# Patient Record
Sex: FEMALE | Race: BLACK OR AFRICAN AMERICAN | NOT HISPANIC OR LATINO | Employment: FULL TIME | RURAL
[De-identification: names, ages, dates, MRNs, and addresses within clinical notes are randomized per-mention and may not be internally consistent; named-entity substitution may affect disease eponyms.]

---

## 2020-09-28 ENCOUNTER — HISTORICAL (OUTPATIENT)
Dept: ADMINISTRATIVE | Facility: HOSPITAL | Age: 60
End: 2020-09-28

## 2021-10-26 ENCOUNTER — CLINICAL SUPPORT (OUTPATIENT)
Dept: PRIMARY CARE CLINIC | Facility: CLINIC | Age: 61
End: 2021-10-26

## 2021-10-26 DIAGNOSIS — Z23 INFLUENZA VACCINE NEEDED: Primary | ICD-10-CM

## 2021-10-26 PROCEDURE — 90686 IIV4 VACC NO PRSV 0.5 ML IM: CPT | Mod: ,,, | Performed by: NURSE PRACTITIONER

## 2021-10-26 PROCEDURE — 90471 PR IMMUNIZ ADMIN,1 SINGLE/COMB VAC/TOXOID: ICD-10-PCS | Mod: ,,, | Performed by: NURSE PRACTITIONER

## 2021-10-26 PROCEDURE — 90686 FLU VACCINE (QUAD) GREATER THAN OR EQUAL TO 3YO PRESERVATIVE FREE IM: ICD-10-PCS | Mod: ,,, | Performed by: NURSE PRACTITIONER

## 2021-10-26 PROCEDURE — 90471 IMMUNIZATION ADMIN: CPT | Mod: ,,, | Performed by: NURSE PRACTITIONER

## 2022-05-16 DIAGNOSIS — G56.02 CARPAL TUNNEL SYNDROME ON LEFT: Primary | ICD-10-CM

## 2022-05-18 NOTE — PROGRESS NOTES
[facility] Occupational Therapy  Initial Evaluation     Date: 5/19/2022  Name: Madhavi Simons  Clinic Number: [id number]    Therapy Diagnosis: No diagnosis found.  Physician: Geovanny Kendrick MD    Physician Orders: Evaluate and treat.  Medical Diagnosis: Carpal tunnel syndrome on left [G56.02]  Surgical Procedure and Date: 4/1/2022, / Date of Injury/Onset: 2021  Evaluation Date: 5/19/2022  Insurance Authorization Period Expiration: 5/16/2022 - 5/16/2023  Plan of Care Certification Period: 5/19/2022-7/28/2022  Date of Return to MD: 5 weeks    Visit # / Visits authorized: 1 / 20    FOTO: initial eval  Medicare Amount:     Time In:4:00  Time Out: 4:40  Total treatment time: 40minutes      Precautions:  Standard    Subjective     Involved Side: Left upper extremity  Dominant Side: Right  Date of Onset: 2021  History of Current Condition: Pt. Reports she started having pain in left hand in 2021. Reports pain continued to get worse. She saw her PCP her referred her to orthopedic surgeon. 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 Surgery performed on 4/1/2022. She was in a splint for 2 weeks and a brace for 4 weeks.    Imaging: MRI studies, bone scan films       Past Medical History/Physical Systems Review:   Madhavi Simons  has no past medical history on file.    Madhavi Simons  has no past surgical history on file.    Madhavi currently has no medications in their medication list.    Review of patient's allergies indicates:  Not on File     Patient's Goals for Therapy: To be able to use LUE again without pain.    Pain:  Functional Pain Scale Rating 0-10:   5/10 on average  5/10 at best  10/10 at worst  Location: Left wrist/hand  Description: Aching, Dull, Burning, Throbbing, Grabbing, Tight, Tingling, Numb, Sharp, Electric and Shooting  Aggravating Factors: Bending, Touching, Walking, Night Time, Morning, Extension, Flexing, Lifting and Getting out of bed/chair  Easing  Factors: massage, relaxation, ice, rest and elevation    Occupation:  [employer]  Working presently: employed      Functional Limitations/Social History:    Previous functional status includes: Independent with all ADLs.     Current FunctionalStatus   Home/Living environment : lives with their spouse      Limitation of Functional Status as follows:   ADLs/IADLs:     - Feeding: (I)    - Bathing: (I)    - Dressing/Grooming: (I)    - Driving: (I)             Objective     Observation/Appearance:     Edema. Measured in centimeters.   5/18/2022 5/18/2022    Right Left   Wrist Crease 14.5 15.4   MCPs 17.5 18     Edema. Measured in centimeters.   5/18/2022 5/18/2022    Right Left   Index:       P1      PIP     P2      DIP     P3     Long:       P1      PIP     P2      DIP     P3     Ring:       P1                 PIP                P2                  DIP     P3     Small:        P1                 PIP            P2             DIP     P3     Thumb:     Prox. Phalanx     IP     Distal Phalanx       Elbow and Wrist ROM. Measured in degrees.   5/18/2022 5/18/2022    Right Left   Wrist Ext/Flex 80/90 60/36     Hand ROM. Measured in degrees.   5/18/2022 5/18/2022    Right Left        Index: MP  88 84              PIP     110 113              DIP 82 72              HINES          Long:  MP 86 89               116              DIP 80 71              HINES          Ring:   MP 81 90               111              DIP 74 71              HINES          Small:  MP 91 91                101               DIP 76 68              HINES          Thumb: MP 56 65                IP 81 65      Strength (Dynamometer) and Pinch Strength (Pinch Gauge)  Measured in pounds.   5/18/2022 5/18/2022    Right Left   Rung II 43 NT   Key Pinch 11 NT   3pt Pinch 10 NT   2pt Pinch 7 NT       Manual Muscle Test   5/18/2022 5/18/2022    Right Left   Wrist Extension  5/5 3+/5   Wrist Flexion 5/5 3+/5         Special Tests  Thumb CMC Grind  Test    Finkelstein's Test    Phalen's Test    Tinel's Test    Ric's Test    Mingo-Littler Test    Digital Collateral Stress Test    ORL Test    Froment's Sign    Pinch OK Sign    Hollie's Sign     Egawa Sign     Clamp Sign     SL Ballottement Test    LT Ballottement Test    Scaphoid/WatsonTest    Linscheid's Test    Metacarpal Stress Test    Piano Key Test    ECU Synergy Test    Ulnar Compression Test    TFCC Load Test    Ulnocarpal Stress Test    Midcarpal Shift Test    Pisiform Boost Test    Elbow Flexion Test    Scratch Collapse Test    Tennis Elbow Test    Resisted Middle Finger Extension Test    Mills Test    Chair Test    Biceps Squeeze Test    Biceps Hook Test    Milking Test    Press Up Manuever    PLRI Test    Valgus Stress Test    Varus Stress Test    Spurling Test    Cervical Distraction Test    ULNTT - General    ULNTT - Median Nerve    ULNTT - Radial Nerve    ULNTT - Ulnar Nerve         CMS Impairment/Limitation/Restriction for FOTO  Survey    Therapist reviewed FOTO scores for Madhavi Simons on 5/19/2022.   FOTO documents entered into [employer] - see Media section.    Limitation Score: 40%         Treatment     Treatment Time In: 11:00  Treatment Time Out: 11:40    MADHAVI received the following supervised modalities after being cleared for contradictions for  minutes:   -N/A    MADHAVI received the following manual therapy techniques for  minutes:   -N/A    MADHAVI received therapeutic exercises for  minutes including:  -N/A    Home Exercise Program/Education:  Issued HEP (see patient instructions in EMR) and educated on modality use for pain management . Exercises were reviewed and MADHAVI was able to demonstrate them prior to the end of the session.   Pt received a written copy of exercises to perform at home. MADHAVI demonstrated good  understanding of the education provided.  Pt was advised to perform these exercises free of pain, and to stop performing them if pain occurs.    Patient/Family Education:  role of OT, goals for OT, scheduling/cancellations - pt verbalized understanding. Discussed insurance limitations with patient.    Additional Education provided:     Assessment     Madhavi Simons is a 61 y.o. female referred to outpatient occupational therapy and presents with a medical diagnosis of Left carpal tunnel, AIN,and PIN release, resulting in decreased ROM/strength with increased pain/edema and numbness/tingling and demonstrates limitations as described above. Following medical record review it is determined that pt will benefit from occupational therapy services in order to maximize pain free and/or functional use of left upper extremity. The following goals were discussed with the patient and patient is in agreement with them as to be addressed in the treatment plan. The patient's rehab potential is Good.     Anticipated barriers to occupational therapy:   Pt has no cultural, educational or language barriers to learning provided.        Goals:   The following goals were discussed with the patient and patient is in agreement with them as to be addressed in the treatment plan.   Short term Goals:  1) Initiate HEP  2) Pt will increase AROM of LUE by 5-10 degrees in order to assist with functional full fist by 4 weeks.  3) Pt will reduce edema by .5-1 cm in affected fingers by 4 weeks.  4) Pt will reduce pain to less than 4/10 by 4 weeks.  5) Pt will increase functional  strength by 5# in order to A in opening containers for med management or home management tasks by 4 weeks.   6) Patient will be able to achieve less than or equal to 40% on the FOTO, demonstrating overall improved functional ability with upper extremity. (Self-care category)    Long Term Goals:  Goals to be met by discharge:  1) Independent with HEP  2) Pt will increase LUE HINES by 15-20 degrees in order to increase functional fist for grasp with home management or work related tasks by d/c.   3) Pt will decrease edema to trace or none  to increase functional ROM by d/c.   4) Pt will decrease pain to trace or none while completing light home management tasks or work related tasks by d/c.   5) Patient will be able to achieve less than or equal to 80% on the FOTO, demonstrating overall improved functional ability with upper extremity.  (Self-care category)        Plan   Certification Period/Plan of care expiration: 5/19/2022 to 7/28/2022.    Outpatient Occupational Therapy 2 times weekly for 10 weeks to include the following interventions: Paraffin, Fluidotherapy, Manual therapy/joint mobilizations, Modalities for pain management, US 3 mhz, Therapeutic exercises/activities., Iontophoresis with 2.0 cc Dexamethasone, Strengthening, Orthotic Fabrication/Fit/Training, Edema Control, Scar Management, Electrical Modalities, Joint Protection and Energy Conservation.      RONEY YU, OT

## 2022-05-19 ENCOUNTER — CLINICAL SUPPORT (OUTPATIENT)
Dept: REHABILITATION | Facility: HOSPITAL | Age: 62
End: 2022-05-19
Payer: COMMERCIAL

## 2022-05-19 DIAGNOSIS — G56.02 CARPAL TUNNEL SYNDROME ON LEFT: ICD-10-CM

## 2022-05-19 PROCEDURE — 97166 OT EVAL MOD COMPLEX 45 MIN: CPT

## 2022-05-19 NOTE — PLAN OF CARE
[facility] Occupational Therapy  Initial Evaluation     Date: 5/19/2022  Name: Madhavi Simons  Clinic Number: [id number]    Therapy Diagnosis: No diagnosis found.  Physician: Geovanny Kendrick MD    Physician Orders: Evaluate and treat.  Medical Diagnosis: Carpal tunnel syndrome on left [G56.02]  Surgical Procedure and Date: 4/1/2022, / Date of Injury/Onset: 2021  Evaluation Date: 5/19/2022  Insurance Authorization Period Expiration: 5/16/2022 - 5/16/2023  Plan of Care Certification Period: 5/19/2022-7/28/2022  Date of Return to MD: 5 weeks    Visit # / Visits authorized: 1 / 20    FOTO: initial eval  Medicare Amount:     Time In:4:00  Time Out: 4:40  Total treatment time: 40minutes      Precautions:  Standard    Subjective     Involved Side: Left upper extremity  Dominant Side: Right  Date of Onset: 2021  History of Current Condition: Pt. Reports she started having pain in left hand in 2021. Reports pain continued to get worse. She saw her PCP her referred her to orthopedic surgeon. 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 Surgery performed on 4/1/2022. She was in a splint for 2 weeks and a brace for 4 weeks.    Imaging: MRI studies, bone scan films       Past Medical History/Physical Systems Review:   Madhavi Simons  has no past medical history on file.    Madhavi Simons  has no past surgical history on file.    Madhavi currently has no medications in their medication list.    Review of patient's allergies indicates:  Not on File     Patient's Goals for Therapy: To be able to use LUE again without pain.    Pain:  Functional Pain Scale Rating 0-10:   5/10 on average  5/10 at best  10/10 at worst  Location: Left wrist/hand  Description: Aching, Dull, Burning, Throbbing, Grabbing, Tight, Tingling, Numb, Sharp, Electric and Shooting  Aggravating Factors: Bending, Touching, Walking, Night Time, Morning, Extension, Flexing, Lifting and Getting out of bed/chair  Easing  Factors: massage, relaxation, ice, rest and elevation    Occupation:  [employer]  Working presently: employed      Functional Limitations/Social History:    Previous functional status includes: Independent with all ADLs.     Current FunctionalStatus   Home/Living environment : lives with their spouse      Limitation of Functional Status as follows:   ADLs/IADLs:     - Feeding: (I)    - Bathing: (I)    - Dressing/Grooming: (I)    - Driving: (I)             Objective     Observation/Appearance:     Edema. Measured in centimeters.   5/18/2022 5/18/2022    Right Left   Wrist Crease 14.5 15.4   MCPs 17.5 18     Edema. Measured in centimeters.   5/18/2022 5/18/2022    Right Left   Index:       P1      PIP     P2      DIP     P3     Long:       P1      PIP     P2      DIP     P3     Ring:       P1                 PIP                P2                  DIP     P3     Small:        P1                 PIP            P2             DIP     P3     Thumb:     Prox. Phalanx     IP     Distal Phalanx       Elbow and Wrist ROM. Measured in degrees.   5/18/2022 5/18/2022    Right Left   Wrist Ext/Flex 80/90 60/36     Hand ROM. Measured in degrees.   5/18/2022 5/18/2022    Right Left        Index: MP  88 84              PIP     110 113              DIP 82 72              HINES          Long:  MP 86 89               116              DIP 80 71              HINES          Ring:   MP 81 90               111              DIP 74 71              HINES          Small:  MP 91 91                101               DIP 76 68              HINES          Thumb: MP 56 65                IP 81 65      Strength (Dynamometer) and Pinch Strength (Pinch Gauge)  Measured in pounds.   5/18/2022 5/18/2022    Right Left   Rung II 43 NT   Key Pinch 11 NT   3pt Pinch 10 NT   2pt Pinch 7 NT       Manual Muscle Test   5/18/2022 5/18/2022    Right Left   Wrist Extension  5/5 3+/5   Wrist Flexion 5/5 3+/5         Special Tests  Thumb CMC Grind  Test    Finkelstein's Test    Phalen's Test    Tinel's Test    Ric's Test    Mingo-Littler Test    Digital Collateral Stress Test    ORL Test    Froment's Sign    Pinch OK Sign    Hollie's Sign     Egawa Sign     Clamp Sign     SL Ballottement Test    LT Ballottement Test    Scaphoid/WatsonTest    Linscheid's Test    Metacarpal Stress Test    Piano Key Test    ECU Synergy Test    Ulnar Compression Test    TFCC Load Test    Ulnocarpal Stress Test    Midcarpal Shift Test    Pisiform Boost Test    Elbow Flexion Test    Scratch Collapse Test    Tennis Elbow Test    Resisted Middle Finger Extension Test    Mills Test    Chair Test    Biceps Squeeze Test    Biceps Hook Test    Milking Test    Press Up Manuever    PLRI Test    Valgus Stress Test    Varus Stress Test    Spurling Test    Cervical Distraction Test    ULNTT - General    ULNTT - Median Nerve    ULNTT - Radial Nerve    ULNTT - Ulnar Nerve         CMS Impairment/Limitation/Restriction for FOTO  Survey    Therapist reviewed FOTO scores for Madhavi Simons on 5/19/2022.   FOTO documents entered into [employer] - see Media section.    Limitation Score: 40%         Treatment     Treatment Time In: 11:00  Treatment Time Out: 11:40    MADHAVI received the following supervised modalities after being cleared for contradictions for  minutes:   -N/A    MADHAVI received the following manual therapy techniques for  minutes:   -N/A    MADHAVI received therapeutic exercises for  minutes including:  -N/A    Home Exercise Program/Education:  Issued HEP (see patient instructions in EMR) and educated on modality use for pain management . Exercises were reviewed and MADHAVI was able to demonstrate them prior to the end of the session.   Pt received a written copy of exercises to perform at home. MADHAVI demonstrated good  understanding of the education provided.  Pt was advised to perform these exercises free of pain, and to stop performing them if pain occurs.    Patient/Family Education:  role of OT, goals for OT, scheduling/cancellations - pt verbalized understanding. Discussed insurance limitations with patient.    Additional Education provided:     Assessment     Madhavi Simons is a 61 y.o. female referred to outpatient occupational therapy and presents with a medical diagnosis of Left carpal tunnel, AIN,and PIN release, resulting in decreased ROM/strength with increased pain/edema and numbness/tingling and demonstrates limitations as described above. Following medical record review it is determined that pt will benefit from occupational therapy services in order to maximize pain free and/or functional use of left upper extremity. The following goals were discussed with the patient and patient is in agreement with them as to be addressed in the treatment plan. The patient's rehab potential is Good.     Anticipated barriers to occupational therapy:   Pt has no cultural, educational or language barriers to learning provided.        Goals:   The following goals were discussed with the patient and patient is in agreement with them as to be addressed in the treatment plan.   Short term Goals:  1) Initiate HEP  2) Pt will increase AROM of LUE by 5-10 degrees in order to assist with functional full fist by 4 weeks.  3) Pt will reduce edema by .5-1 cm in affected fingers by 4 weeks.  4) Pt will reduce pain to less than 4/10 by 4 weeks.  5) Pt will increase functional  strength by 5# in order to A in opening containers for med management or home management tasks by 4 weeks.   6) Patient will be able to achieve less than or equal to 40% on the FOTO, demonstrating overall improved functional ability with upper extremity. (Self-care category)    Long Term Goals:  Goals to be met by discharge:  1) Independent with HEP  2) Pt will increase LUE HINES by 15-20 degrees in order to increase functional fist for grasp with home management or work related tasks by d/c.   3) Pt will decrease edema to trace or none  to increase functional ROM by d/c.   4) Pt will decrease pain to trace or none while completing light home management tasks or work related tasks by d/c.   5) Patient will be able to achieve less than or equal to 80% on the FOTO, demonstrating overall improved functional ability with upper extremity.  (Self-care category)        Plan   Certification Period/Plan of care expiration: 5/19/2022 to 7/28/2022.    Outpatient Occupational Therapy 2 times weekly for 10 weeks to include the following interventions: Paraffin, Fluidotherapy, Manual therapy/joint mobilizations, Modalities for pain management, US 3 mhz, Therapeutic exercises/activities., Iontophoresis with 2.0 cc Dexamethasone, Strengthening, Orthotic Fabrication/Fit/Training, Edema Control, Scar Management, Electrical Modalities, Joint Protection and Energy Conservation.      RONEY YU, OT

## 2022-06-08 ENCOUNTER — CLINICAL SUPPORT (OUTPATIENT)
Dept: REHABILITATION | Facility: HOSPITAL | Age: 62
End: 2022-06-08
Payer: COMMERCIAL

## 2022-06-08 DIAGNOSIS — G56.02 CARPAL TUNNEL SYNDROME ON LEFT: Primary | ICD-10-CM

## 2022-06-08 PROCEDURE — 97110 THERAPEUTIC EXERCISES: CPT

## 2022-06-08 PROCEDURE — 97033 APP MDLTY 1+IONTPHRSIS EA 15: CPT

## 2022-06-08 PROCEDURE — 97140 MANUAL THERAPY 1/> REGIONS: CPT

## 2022-06-08 NOTE — PROGRESS NOTES
[facility]  Occupational Therapy Treatment Note    Date: 6/8/2022  Name: Madhavi ALAN [facility] Number: [id number]    Therapy Diagnosis: No diagnosis found.  Physician: Geovanny Kendrick MD    Physician Orders: Evaluate and treat.  Medical Diagnosis: Carpal tunnel syndrome on left [G56.02]  Surgical Procedure and Date: 4/1/2022,   Evaluation Date: 5/19/2022  Insurance Authorization Period Expiration: 5/16/2022 - 5/16/2023  Plan of Care Expiration: 5/19/2022-7/28/2022  Date of Return to MD: 5weeks  Visit # / Visits authorized: 2 / 20      Precautions:  Standard    Time In: 4:42  Time Out: 5:40  Total Billable Time: 58 minutes    SUBJECTIVE     Pt reports: reports no new complaints  She was compliant with home exercise program given last session.   Response to previous treatment:  Functional change:     Pain: 4/10  Location: left fingers , hands  and wrists      OBJECTIVE     Objective Measures updated at progress report unless specified.    Treatment     MADHAVI received the treatments listed below:     Supervised modalities after being cleared for contradictions: Paraffin bath - N/A    Direct contact modalities after being cleared for contraindications:Iontophoresis- 10min.    Manual therapy techniques: Joint mobilizations, Myofacial release, Soft tissue Mobilization and Friction Massage were applied to the: LUE for 20 minutes, including:  -LUE PROM- wrist flx/ext- 2x10 , joint mobz/distraction- 2x10 , Tendon glides- 3x5    Therapeutic exercises to develop strength, endurance, ROM and flexibility for 28 minutes, including:  -LUE AROM- wrist flx/ext tband-2x10  , Handgripper- 5x10 , therabar-2x10  , powerweb flx/ext-2x10  , digi flx-  1x15        Patient Education and Home Exercises      Education provided:   -   - Progress towards goals     Written Home Exercises Provided: Patient instructed to cont prior HEP.  Exercises were reviewed and MADHAVI was able to demonstrate them prior to the end of  the session.  DANNA demonstrated good  understanding of the HEP provided. See EMR under Patient Instructions for exercises provided during therapy sessions.       Assessment     Pt would continue to benefit from skilled OT.      DANNA is progressing well towards her goals and there are no updates to goals at this time. Pt prognosis is Excellent.     Pt will continue to benefit from skilled outpatient occupational therapy to address the deficits listed in the problem list on initial evaluation provide pt/family education and to maximize pt's level of independence in the home and community environment.     Pt's spiritual, cultural and educational needs considered and pt agreeable to plan of care and goals.    Anticipated barriers to occupational therapy:     Goals:  Pt. Will increase AROM of LUE as measured by goniometric measurements.  Pt. Will increase Left /pinch strength as measured by dynamometer/pinch gauge.  Pt. Will demonstrate ability to utilize LUE to perform functional tasks (I).  Pt. Will be (I) with HEP.    PLAN     Continue OT POC.      RONEY YU, OT

## 2022-06-09 ENCOUNTER — CLINICAL SUPPORT (OUTPATIENT)
Dept: REHABILITATION | Facility: HOSPITAL | Age: 62
End: 2022-06-09
Payer: COMMERCIAL

## 2022-06-09 DIAGNOSIS — G56.02 CARPAL TUNNEL SYNDROME ON LEFT: Primary | ICD-10-CM

## 2022-06-09 PROCEDURE — 97140 MANUAL THERAPY 1/> REGIONS: CPT

## 2022-06-09 PROCEDURE — 97018 PARAFFIN BATH THERAPY: CPT | Mod: 59

## 2022-06-09 PROCEDURE — 97110 THERAPEUTIC EXERCISES: CPT

## 2022-06-09 NOTE — PROGRESS NOTES
[facility]  Occupational Therapy Treatment Note    Date: 6/9/2022  [id number]    Therapy Diagnosis: No diagnosis found.  Physician: Geovanny Kendrick MD    Physician Orders: Evaluate and treat.  Medical Diagnosis: Carpal tunnel syndrome on left [G56.02]  Surgical Procedure and Date: 4/1/2022,   Evaluation Date: 5/19/2022  Insurance Authorization Period Expiration: 5/16/2022 - 5/16/2023  Plan of Care Expiration: 5/19/2022-7/28/2022  Date of Return to MD: 5weeks  Visit # / Visits authorized: 3 / 20      Precautions:  Standard    Time In: 2:56  Time Out: 3:52  Total Billable Time: 56 minutes    SUBJECTIVE     Pt reports: she was sore from yesterdays session.  She was compliant with home exercise program given last session.   Response to previous treatment:  Functional change:     Pain: 5/10  Location: left fingers , hands  and wrists      OBJECTIVE     Objective Measures updated at progress report unless specified.    Treatment     MADHAVI received the treatments listed below:     Supervised modalities after being cleared for contradictions: Paraffin bath - 8min.    Direct contact modalities after being cleared for contraindications:N/A    Manual therapy techniques: Joint mobilizations, Myofacial release, Soft tissue Mobilization and Friction Massage were applied to the: LUE for 20 minutes, including:  -LUE PROM- wrist flx/ext- 2x10 , joint mobz/distraction- 2x10 , Tendon glides- 3x5    Therapeutic exercises to develop strength, endurance, ROM and flexibility for 26 minutes, including:  -LUE AROM- wrist flx/ext tband-2x10  , Handgripper- 5x10 , therabar-2x10  , powerweb flx/ext-2x10  , digi flx-  1x15        Patient Education and Home Exercises      Education provided:   -   - Progress towards goals     Written Home Exercises Provided: Patient instructed to cont prior HEP.  Exercises were reviewed and MADHAVI was able to demonstrate them prior to the end of the  session.  DANNA demonstrated good  understanding of the HEP provided. See EMR under Patient Instructions for exercises provided during therapy sessions.       Assessment     Pt would continue to benefit from skilled OT.      DANNA is progressing well towards her goals and there are no updates to goals at this time. Pt prognosis is Excellent.     Pt will continue to benefit from skilled outpatient occupational therapy to address the deficits listed in the problem list on initial evaluation provide pt/family education and to maximize pt's level of independence in the home and community environment.     Pt's spiritual, cultural and educational needs considered and pt agreeable to plan of care and goals.    Anticipated barriers to occupational therapy:     Goals:  Pt. Will increase AROM of LUE as measured by goniometric measurements.  Pt. Will increase Left /pinch strength as measured by dynamometer/pinch gauge.  Pt. Will demonstrate ability to utilize LUE to perform functional tasks (I).  Pt. Will be (I) with HEP.    PLAN     Continue OT POC.      RONEY YU, OT

## 2022-06-14 ENCOUNTER — CLINICAL SUPPORT (OUTPATIENT)
Dept: REHABILITATION | Facility: HOSPITAL | Age: 62
End: 2022-06-14
Payer: COMMERCIAL

## 2022-06-14 DIAGNOSIS — G56.02 CARPAL TUNNEL SYNDROME ON LEFT: Primary | ICD-10-CM

## 2022-06-14 PROCEDURE — 97140 MANUAL THERAPY 1/> REGIONS: CPT

## 2022-06-14 PROCEDURE — 97110 THERAPEUTIC EXERCISES: CPT

## 2022-06-14 PROCEDURE — 97018 PARAFFIN BATH THERAPY: CPT

## 2022-06-14 NOTE — PROGRESS NOTES
[facility]  Occupational Therapy Treatment Note    Date: 6/14/2022  [id number]    Therapy Diagnosis: No diagnosis found.  Physician: Geovanny Kendrick MD    Physician Orders: Evaluate and treat.  Medical Diagnosis: Carpal tunnel syndrome on left [G56.02]  Surgical Procedure and Date: 4/1/2022,   Evaluation Date: 5/19/2022  Insurance Authorization Period Expiration: 5/16/2022 - 5/16/2023  Plan of Care Expiration: 5/19/2022-7/28/2022  Date of Return to MD: 5weeks  Visit # / Visits authorized: 4 / 20      Precautions:  Standard    Time In: 3:56  Time Out: 4:51  Total Billable Time: 55 minutes    SUBJECTIVE     Pt reports: she was in pain over the weekend but is much better now.  She was compliant with home exercise program given last session.   Response to previous treatment:  Functional change:     Pain: 3/10  Location: left fingers , hands  and wrists      OBJECTIVE     Objective Measures updated at progress report unless specified.    Treatment     MADHAVI received the treatments listed below:     Supervised modalities after being cleared for contradictions: Paraffin bath - 8min.    Direct contact modalities after being cleared for contraindications:N/A    Manual therapy techniques: Joint mobilizations, Myofacial release, Soft tissue Mobilization and Friction Massage were applied to the: LUE for 20 minutes, including:  -LUE PROM- wrist flx/ext- 2x10 , joint mobz/distraction- 2x10 , Tendon glides- 3x5    Therapeutic exercises to develop strength, endurance, ROM and flexibility for 27 minutes, including:  -LUE AROM- wrist flx/ext tband-2x10  , Handgripper- 5x10 , therabar-2x10  , powerweb flx/ext-2x10  , digi flx-  1x15        Patient Education and Home Exercises      Education provided:   -   - Progress towards goals     Written Home Exercises Provided: Patient instructed to cont prior HEP.  Exercises were reviewed and MADHAVI was able to demonstrate them prior  to the end of the session.  DANNA demonstrated good  understanding of the HEP provided. See EMR under Patient Instructions for exercises provided during therapy sessions.       Assessment     Pt would continue to benefit from skilled OT.      DANNA is progressing well towards her goals and there are no updates to goals at this time. Pt prognosis is Excellent.     Pt will continue to benefit from skilled outpatient occupational therapy to address the deficits listed in the problem list on initial evaluation provide pt/family education and to maximize pt's level of independence in the home and community environment.     Pt's spiritual, cultural and educational needs considered and pt agreeable to plan of care and goals.    Anticipated barriers to occupational therapy:     Goals:  Pt. Will increase AROM of LUE as measured by goniometric measurements.  Pt. Will increase Left /pinch strength as measured by dynamometer/pinch gauge.  Pt. Will demonstrate ability to utilize LUE to perform functional tasks (I).  Pt. Will be (I) with HEP.    PLAN     Continue OT POC.      RONEY YU, OT

## 2022-06-16 ENCOUNTER — CLINICAL SUPPORT (OUTPATIENT)
Dept: REHABILITATION | Facility: HOSPITAL | Age: 62
End: 2022-06-16
Payer: COMMERCIAL

## 2022-06-16 DIAGNOSIS — G56.02 CARPAL TUNNEL SYNDROME ON LEFT: Primary | ICD-10-CM

## 2022-06-16 PROCEDURE — 97033 APP MDLTY 1+IONTPHRSIS EA 15: CPT

## 2022-06-16 PROCEDURE — 97140 MANUAL THERAPY 1/> REGIONS: CPT

## 2022-06-16 PROCEDURE — 97110 THERAPEUTIC EXERCISES: CPT

## 2022-06-16 NOTE — PROGRESS NOTES
[facility]  Occupational Therapy Treatment Note    Date: 6/16/2022  Name: Madhavi ALAN [facility] Number: [id number]    Therapy Diagnosis: No diagnosis found.  Physician: Geovanny Kendrick MD    Physician Orders: Evaluate and treat.  Medical Diagnosis: Carpal tunnel syndrome on left [G56.02]  Surgical Procedure and Date: 4/1/2022,   Evaluation Date: 5/19/2022  Insurance Authorization Period Expiration: 5/16/2022 - 5/16/2023  Plan of Care Expiration: 5/19/2022-7/28/2022  Date of Return to MD: 5weeks  Visit # / Visits authorized: 5/ 20      Precautions:  Standard    Time In: 3:57  Time Out: 4:51  Total Billable Time: 54 minutes    SUBJECTIVE     Pt reports: she has been having pain first thing in the morning.  She was compliant with home exercise program given last session.   Response to previous treatment:  Functional change:     Pain: 4/10  Location: left fingers , hands  and wrists      OBJECTIVE     Objective Measures updated at progress report unless specified.    Treatment     MADHAVI received the treatments listed below:     Supervised modalities after being cleared for contradictions: Paraffin bath - N/A    Direct contact modalities after being cleared for contraindications:Iontophoresis-8min.    Manual therapy techniques: Joint mobilizations, Myofacial release, Soft tissue Mobilization and Friction Massage were applied to the: LUE for 20 minutes, including:  -LUE PROM- wrist flx/ext- 2x10 , joint mobz/distraction- 2x10 , Tendon glides- 3x5    Therapeutic exercises to develop strength, endurance, ROM and flexibility for 26 minutes, including:  -LUE AROM- wrist flx/ext tband-2x10  , Handgripper- 5x10 , therabar-2x10  , powerweb flx/ext-2x10  , digi flx-  1x15        Patient Education and Home Exercises      Education provided:   -   - Progress towards goals     Written Home Exercises Provided: Patient instructed to cont prior HEP.  Exercises were reviewed and MADHAVI was able to demonstrate  them prior to the end of the session.  DANNA demonstrated good  understanding of the HEP provided. See EMR under Patient Instructions for exercises provided during therapy sessions.       Assessment     Pt would continue to benefit from skilled OT.      DANNA is progressing well towards her goals and there are no updates to goals at this time. Pt prognosis is Excellent.     Pt will continue to benefit from skilled outpatient occupational therapy to address the deficits listed in the problem list on initial evaluation provide pt/family education and to maximize pt's level of independence in the home and community environment.     Pt's spiritual, cultural and educational needs considered and pt agreeable to plan of care and goals.    Anticipated barriers to occupational therapy:     Goals:  Pt. Will increase AROM of LUE as measured by goniometric measurements.  Pt. Will increase Left /pinch strength as measured by dynamometer/pinch gauge.  Pt. Will demonstrate ability to utilize LUE to perform functional tasks (I).  Pt. Will be (I) with HEP.    PLAN     Continue OT POC.      RONEY YU, OT

## 2022-06-20 ENCOUNTER — CLINICAL SUPPORT (OUTPATIENT)
Dept: REHABILITATION | Facility: HOSPITAL | Age: 62
End: 2022-06-20
Payer: COMMERCIAL

## 2022-06-20 DIAGNOSIS — G56.02 CARPAL TUNNEL SYNDROME ON LEFT: Primary | ICD-10-CM

## 2022-06-20 PROCEDURE — 97018 PARAFFIN BATH THERAPY: CPT

## 2022-06-20 PROCEDURE — 97110 THERAPEUTIC EXERCISES: CPT

## 2022-06-20 PROCEDURE — 97140 MANUAL THERAPY 1/> REGIONS: CPT

## 2022-06-20 NOTE — PROGRESS NOTES
[facility]  Occupational Therapy Treatment Note    Date: 6/20/2022  [id number]    Therapy Diagnosis: No diagnosis found.  Physician: Geovanny Kendrick MD    Physician Orders: Evaluate and treat.  Medical Diagnosis: Carpal tunnel syndrome on left [G56.02]  Surgical Procedure and Date: 4/1/2022,   Evaluation Date: 5/19/2022  Insurance Authorization Period Expiration: 5/16/2022 - 5/16/2023  Plan of Care Expiration: 5/19/2022-7/28/2022  Date of Return to MD: 5weeks  Visit # / Visits authorized: 6/ 20      Precautions:  Standard    Time In: 3:52  Time Out: 4:51  Total Billable Time: 59 minutes    SUBJECTIVE     Pt reports: she had pain last night and this morning with more numbness/tingling.   She was compliant with home exercise program given last session.   Response to previous treatment:  Functional change:     Pain: 3/10  Location: left fingers , hands  and wrists      OBJECTIVE     Objective Measures updated at progress report unless specified.    Treatment     MADHAVI received the treatments listed below:     Supervised modalities after being cleared for contradictions: Paraffin bath - 8min.    Direct contact modalities after being cleared for contraindications:Iontophoresis-N/A    Manual therapy techniques: Joint mobilizations, Myofacial release, Soft tissue Mobilization and Friction Massage were applied to the: LUE for 21 minutes, including:  -LUE PROM- wrist flx/ext- 2x10 , joint mobz/distraction- 2x10 , Tendon glides- 3x5    Therapeutic exercises to develop strength, endurance, ROM and flexibility for 30 minutes, including:  -LUE AROM- wrist flx/ext tband-2x10  , Handgripper- 5x10 , therabar-2x10  , powerweb flx/ext-2x10  , digi flx-  2x15        Patient Education and Home Exercises      Education provided:   -   - Progress towards goals     Written Home Exercises Provided: Patient instructed to cont prior HEP.  Exercises were reviewed and MADHAVI was  able to demonstrate them prior to the end of the session.  DANNA demonstrated good  understanding of the HEP provided. See EMR under Patient Instructions for exercises provided during therapy sessions.       Assessment     Pt would continue to benefit from skilled OT.      DANNA is progressing well towards her goals and there are no updates to goals at this time. Pt prognosis is Excellent.     Pt will continue to benefit from skilled outpatient occupational therapy to address the deficits listed in the problem list on initial evaluation provide pt/family education and to maximize pt's level of independence in the home and community environment.     Pt's spiritual, cultural and educational needs considered and pt agreeable to plan of care and goals.    Anticipated barriers to occupational therapy:     Goals:  Pt. Will increase AROM of LUE as measured by goniometric measurements.  Pt. Will increase Left /pinch strength as measured by dynamometer/pinch gauge.  Pt. Will demonstrate ability to utilize LUE to perform functional tasks (I).  Pt. Will be (I) with HEP.    PLAN     Continue OT POC.      RONEY YU, OT

## 2022-06-23 ENCOUNTER — CLINICAL SUPPORT (OUTPATIENT)
Dept: REHABILITATION | Facility: HOSPITAL | Age: 62
End: 2022-06-23
Payer: COMMERCIAL

## 2022-06-23 DIAGNOSIS — G56.02 CARPAL TUNNEL SYNDROME ON LEFT: Primary | ICD-10-CM

## 2022-06-23 PROCEDURE — 97140 MANUAL THERAPY 1/> REGIONS: CPT

## 2022-06-23 PROCEDURE — 97018 PARAFFIN BATH THERAPY: CPT

## 2022-06-23 PROCEDURE — 97110 THERAPEUTIC EXERCISES: CPT

## 2022-06-23 NOTE — PLAN OF CARE
[facility]  Occupational Therapy Progress Note    Date: 6/23/2022  [id number]    Therapy Diagnosis: No diagnosis found.  Physician: Geovanny Kendrick MD    Physician Orders: Evaluate and treat.  Medical Diagnosis: Carpal tunnel syndrome on left [G56.02]  Surgical Procedure and Date: 4/1/2022,   Evaluation Date: 5/19/2022  Insurance Authorization Period Expiration: 5/16/2022 - 5/16/2023  Plan of Care Expiration: 5/19/2022-7/28/2022  Date of Return to MD: 5weeks  Visit # / Visits authorized: 7/ 20      Precautions:  Standard    Time In: 4:00  Time Out: 4:56  Total Billable Time:56  minutes    SUBJECTIVE     Pt reports: she is no pain right now.  She was compliant with home exercise program given last session.   Response to previous treatment:  Functional change:     Pain: 0/10  Location: left fingers , hands  and wrists      OBJECTIVE     Objective Measures updated at progress report unless specified.    Treatment     MADHAVI received the treatments listed below:     Supervised modalities after being cleared for contradictions: Paraffin bath - 8min.    Direct contact modalities after being cleared for contraindications:Iontophoresis-N/A    Manual therapy techniques: Joint mobilizations, Myofacial release, Soft tissue Mobilization and Friction Massage were applied to the: LUE for 20 minutes, including:  -LUE PROM- wrist flx/ext- 2x10 , joint mobz/distraction- 2x10 , Tendon glides- 3x5    Therapeutic exercises to develop strength, endurance, ROM and flexibility for 28 minutes, including:  -LUE AROM- wrist flx/ext tband-2x10  , Handgripper- 5x10 , therabar-2x10  , powerweb flx/ext-2x10  , digi flx-  2x15,         Patient Education and Home Exercises      Education provided:   -   - Progress towards goals     Written Home Exercises Provided: Patient instructed to cont prior HEP.  Exercises were reviewed and MADHAVI was able to demonstrate them prior to the end of the  session.  DANNA demonstrated good  understanding of the HEP provided. See EMR under Patient Instructions for exercises provided during therapy sessions.       Assessment     Pt would continue to benefit from skilled OT.      DANNA is progressing well towards her goals and there are no updates to goals at this time. Pt prognosis is Excellent.     Pt will continue to benefit from skilled outpatient occupational therapy to address the deficits listed in the problem list on initial evaluation provide pt/family education and to maximize pt's level of independence in the home and community environment.     Pt's spiritual, cultural and educational needs considered and pt agreeable to plan of care and goals.    Anticipated barriers to occupational therapy:     Goals:  Pt. Will increase AROM of LUE as measured by goniometric measurements.( LUE AROM-  Wrist flx- 46 , wrist ext- 70 , digit 1 MP-61  , IP-58  , 2MCP- 78 , PIP- 106 , DIP- 76 , 3MCP- 84 , PIP-110  , DIP- 70 , 4MCP-90  , PIP-111  , DIP-67  , 5MCP-91  , PIP-104  , DIP-64  )  Pt. Will increase Left /pinch strength as measured by dynamometer/pinch gauge.( Left -31  , Pinch- Lateral-8  , tip- 4 , tripod- 5 )  Pt. Will demonstrate ability to utilize LUE to perform functional tasks (I).( Pt. Demonstrates ability to utilize LUE to perform functional tasks (I).  Pt. Will be (I) with HEP.( Pt. Verbalizes and demonstrates ability to perform HEP (I).)    PLAN   Continues to progress each week. Continues to exhibit sharp shooting pain with certain positions, however is improving.    Outpatient Occupational Therapy 2 times weekly for 10 weeks to include the following interventions: Paraffin, Fluidotherapy, Manual therapy/joint mobilizations, Modalities for pain management, US 3 mhz, Therapeutic exercises/activities., Iontophoresis with 2.0 cc Dexamethasone, Strengthening, Orthotic Fabrication/Fit/Training, Edema Control, Scar Management, Electrical Modalities, Joint  Protection and Energy Conservation.      RONEY YU, OT

## 2022-06-23 NOTE — PROGRESS NOTES
[facility]  Occupational Therapy Progress Note    Date: 6/23/2022  [id number]    Therapy Diagnosis: No diagnosis found.  Physician: Geovanny Kendrick MD    Physician Orders: Evaluate and treat.  Medical Diagnosis: Carpal tunnel syndrome on left [G56.02]  Surgical Procedure and Date: 4/1/2022,   Evaluation Date: 5/19/2022  Insurance Authorization Period Expiration: 5/16/2022 - 5/16/2023  Plan of Care Expiration: 5/19/2022-7/28/2022  Date of Return to MD: 5weeks  Visit # / Visits authorized: 7/ 20      Precautions:  Standard    Time In: 4:00  Time Out: 4:56  Total Billable Time:56  minutes    SUBJECTIVE     Pt reports: she is no pain right now.  She was compliant with home exercise program given last session.   Response to previous treatment:  Functional change:     Pain: 0/10  Location: left fingers , hands  and wrists      OBJECTIVE     Objective Measures updated at progress report unless specified.    Treatment     MADHAVI received the treatments listed below:     Supervised modalities after being cleared for contradictions: Paraffin bath - 8min.    Direct contact modalities after being cleared for contraindications:Iontophoresis-N/A    Manual therapy techniques: Joint mobilizations, Myofacial release, Soft tissue Mobilization and Friction Massage were applied to the: LUE for 20 minutes, including:  -LUE PROM- wrist flx/ext- 2x10 , joint mobz/distraction- 2x10 , Tendon glides- 3x5    Therapeutic exercises to develop strength, endurance, ROM and flexibility for 28 minutes, including:  -LUE AROM- wrist flx/ext tband-2x10  , Handgripper- 5x10 , therabar-2x10  , powerweb flx/ext-2x10  , digi flx-  2x15,         Patient Education and Home Exercises      Education provided:   -   - Progress towards goals     Written Home Exercises Provided: Patient instructed to cont prior HEP.  Exercises were reviewed and MADHAVI was able to demonstrate them prior to the end of  the session.  DANNA demonstrated good  understanding of the HEP provided. See EMR under Patient Instructions for exercises provided during therapy sessions.       Assessment     Pt would continue to benefit from skilled OT.      DANNA is progressing well towards her goals and there are no updates to goals at this time. Pt prognosis is Excellent.     Pt will continue to benefit from skilled outpatient occupational therapy to address the deficits listed in the problem list on initial evaluation provide pt/family education and to maximize pt's level of independence in the home and community environment.     Pt's spiritual, cultural and educational needs considered and pt agreeable to plan of care and goals.    Anticipated barriers to occupational therapy:     Goals:  Pt. Will increase AROM of LUE as measured by goniometric measurements.( LUE AROM-  Wrist flx- 46 , wrist ext- 70 , digit 1 MP-61  , IP-58  , 2MCP- 78 , PIP- 106 , DIP- 76 , 3MCP- 84 , PIP-110  , DIP- 70 , 4MCP-90  , PIP-111  , DIP-67  , 5MCP-91  , PIP-104  , DIP-64  )  Pt. Will increase Left /pinch strength as measured by dynamometer/pinch gauge.( Left -31  , Pinch- Lateral-8  , tip- 4 , tripod- 5 )  Pt. Will demonstrate ability to utilize LUE to perform functional tasks (I).( Pt. Demonstrates ability to utilize LUE to perform functional tasks (I).  Pt. Will be (I) with HEP.( Pt. Verbalizes and demonstrates ability to perform HEP (I).)    PLAN   Continues to progress each week. Continues to exhibit sharp shooting pain with certain positions, however is improving.    Outpatient Occupational Therapy 2 times weekly for 10 weeks to include the following interventions: Paraffin, Fluidotherapy, Manual therapy/joint mobilizations, Modalities for pain management, US 3 mhz, Therapeutic exercises/activities., Iontophoresis with 2.0 cc Dexamethasone, Strengthening, Orthotic Fabrication/Fit/Training, Edema Control, Scar Management, Electrical Modalities, Joint  Protection and Energy Conservation.      RONEY YU, OT

## 2022-06-28 ENCOUNTER — CLINICAL SUPPORT (OUTPATIENT)
Dept: REHABILITATION | Facility: HOSPITAL | Age: 62
End: 2022-06-28
Payer: COMMERCIAL

## 2022-06-28 DIAGNOSIS — G56.02 CARPAL TUNNEL SYNDROME ON LEFT: Primary | ICD-10-CM

## 2022-06-28 PROCEDURE — 97110 THERAPEUTIC EXERCISES: CPT

## 2022-06-28 PROCEDURE — 97140 MANUAL THERAPY 1/> REGIONS: CPT

## 2022-06-28 PROCEDURE — 97018 PARAFFIN BATH THERAPY: CPT

## 2022-06-28 NOTE — PROGRESS NOTES
[facility]  Occupational Therapy Treatment Note    Date: 6/28/2022  [id number]    Therapy Diagnosis: No diagnosis found.  Physician: Geovanny Kendrick MD    Physician Orders: Evaluate and treat.  Medical Diagnosis: Carpal tunnel syndrome on left [G56.02]  Surgical Procedure and Date: 4/1/2022,   Evaluation Date: 5/19/2022  Insurance Authorization Period Expiration: 5/16/2022 - 5/16/2023  Plan of Care Expiration: 5/19/2022-7/28/2022  Date of Return to MD: 5weeks  Visit # / Visits authorized: 8/ 20      Precautions:  Standard    Time In: 3:57  Time Out: 4:57  Total Billable Time:60  minutes    SUBJECTIVE     Pt reports: no new complaints  She was compliant with home exercise program given last session.   Response to previous treatment:  Functional change:     Pain: 1/10  Location: left fingers , hands  and wrists      OBJECTIVE     Objective Measures updated at progress report unless specified.    Treatment     MADHAVI received the treatments listed below:     Supervised modalities after being cleared for contradictions: Paraffin bath - 8min.    Direct contact modalities after being cleared for contraindications:Iontophoresis-N/A    Manual therapy techniques: Joint mobilizations, Myofacial release, Soft tissue Mobilization and Friction Massage were applied to the: LUE for 22 minutes, including:  -LUE PROM- wrist flx/ext- 2x10 , joint mobz/distraction- 2x10 , Tendon glides- 3x5    Therapeutic exercises to develop strength, endurance, ROM and flexibility for 30 minutes, including:  -LUE AROM- wrist flx/ext tband-2x10  , Handgripper- 5x10 , therabar-2x10  , powerweb flx/ext-2x10  , digi flx-  2x15        Patient Education and Home Exercises      Education provided:   -   - Progress towards goals     Written Home Exercises Provided: Patient instructed to cont prior HEP.  Exercises were reviewed and MADHAVI was able to demonstrate them prior to the end of the  session.  DANNA demonstrated good  understanding of the HEP provided. See EMR under Patient Instructions for exercises provided during therapy sessions.       Assessment     Pt would continue to benefit from skilled OT.      DANNA is progressing well towards her goals and there are no updates to goals at this time. Pt prognosis is Excellent.     Pt will continue to benefit from skilled outpatient occupational therapy to address the deficits listed in the problem list on initial evaluation provide pt/family education and to maximize pt's level of independence in the home and community environment.     Pt's spiritual, cultural and educational needs considered and pt agreeable to plan of care and goals.    Anticipated barriers to occupational therapy:     Goals:  Pt. Will increase AROM of LUE as measured by goniometric measurements.  Pt. Will increase Left /pinch strength as measured by dynamometer/pinch gauge.  Pt. Will demonstrate ability to utilize LUE to perform functional tasks (I).  Pt. Will be (I) with HEP.    PLAN     Continue OT POC.      RONEY YU, OT

## 2022-07-05 ENCOUNTER — CLINICAL SUPPORT (OUTPATIENT)
Dept: REHABILITATION | Facility: HOSPITAL | Age: 62
End: 2022-07-05
Payer: COMMERCIAL

## 2022-07-05 DIAGNOSIS — G56.02 CARPAL TUNNEL SYNDROME ON LEFT: Primary | ICD-10-CM

## 2022-07-05 PROCEDURE — 97033 APP MDLTY 1+IONTPHRSIS EA 15: CPT

## 2022-07-05 PROCEDURE — 97140 MANUAL THERAPY 1/> REGIONS: CPT

## 2022-07-05 PROCEDURE — 97110 THERAPEUTIC EXERCISES: CPT

## 2022-07-05 NOTE — PROGRESS NOTES
[facility]  Occupational Therapy Treatment Note    Date: 7/5/2022  Name: Madhavi ALAN [facility] Number: [id number]    Therapy Diagnosis: No diagnosis found.  Physician: Geovanny Kendrick MD    Physician Orders: Evaluate and treat.  Medical Diagnosis: Carpal tunnel syndrome on left [G56.02]  Surgical Procedure and Date: 4/1/2022,   Evaluation Date: 5/19/2022  Insurance Authorization Period Expiration: 5/16/2022 - 5/16/2023  Plan of Care Expiration: 5/19/2022-7/28/2022  Date of Return to MD: 5weeks  Visit # / Visits authorized: 9/ 20      Precautions:  Standard    Time In: 3:56  Time Out: 4:45  Total Billable Time: 49minutes    SUBJECTIVE     Pt reports: she fell on LUE at home.  She was compliant with home exercise program given last session.   Response to previous treatment:  Functional change:     Pain: 4/10  Location: left fingers , hands  and wrists      OBJECTIVE     Objective Measures updated at progress report unless specified.    Treatment     MADHAVI received the treatments listed below:     Supervised modalities after being cleared for contradictions: Paraffin bath -N/A    Direct contact modalities after being cleared for contraindications:Iontophoresis-8min.    Manual therapy techniques: Joint mobilizations, Myofacial release, Soft tissue Mobilization and Friction Massage were applied to the: LUE for 20 minutes, including:  -LUE PROM- wrist flx/ext- 2x10 , joint mobz/distraction- 2x10 , Tendon glides- 3x5    Therapeutic exercises to develop strength, endurance, ROM and flexibility for 21 minutes, including:  -LUE AROM- wrist flx/ext tband-2x10  , Handgripper- 3x10 , therabar-1x10  , powerweb flx/ext-2x10  , digi flx-  2x15        Patient Education and Home Exercises      Education provided:   -   - Progress towards goals     Written Home Exercises Provided: Patient instructed to cont prior HEP.  Exercises were reviewed and MADHAVI was able to demonstrate them prior to the end of the  session.  DANNA demonstrated good  understanding of the HEP provided. See EMR under Patient Instructions for exercises provided during therapy sessions.       Assessment     Pt would continue to benefit from skilled OT.      DANNA is progressing well towards her goals and there are no updates to goals at this time. Pt prognosis is Excellent.     Pt will continue to benefit from skilled outpatient occupational therapy to address the deficits listed in the problem list on initial evaluation provide pt/family education and to maximize pt's level of independence in the home and community environment.     Pt's spiritual, cultural and educational needs considered and pt agreeable to plan of care and goals.    Anticipated barriers to occupational therapy:     Goals:  Pt. Will increase AROM of LUE as measured by goniometric measurements.  Pt. Will increase Left /pinch strength as measured by dynamometer/pinch gauge.  Pt. Will demonstrate ability to utilize LUE to perform functional tasks (I).  Pt. Will be (I) with HEP.    PLAN   Session ended early today due to pain.  Continue OT POC.      RONEY YU, OT

## 2022-07-07 ENCOUNTER — CLINICAL SUPPORT (OUTPATIENT)
Dept: REHABILITATION | Facility: HOSPITAL | Age: 62
End: 2022-07-07
Payer: COMMERCIAL

## 2022-07-07 DIAGNOSIS — G56.02 CARPAL TUNNEL SYNDROME ON LEFT: Primary | ICD-10-CM

## 2022-07-07 PROCEDURE — 97140 MANUAL THERAPY 1/> REGIONS: CPT

## 2022-07-07 PROCEDURE — 97110 THERAPEUTIC EXERCISES: CPT

## 2022-07-07 NOTE — PROGRESS NOTES
[facility]  Occupational Therapy Treatment Note    Date: 7/7/2022  Name: Madhavi ALAN [facility] Number: [id number]    Therapy Diagnosis: No diagnosis found.  Physician: Geovanny Kendrick MD    Physician Orders: Evaluate and treat.  Medical Diagnosis: Carpal tunnel syndrome on left [G56.02]  Surgical Procedure and Date: 4/1/2022,   Evaluation Date: 5/19/2022  Insurance Authorization Period Expiration: 5/16/2022 - 5/16/2023  Plan of Care Expiration: 5/19/2022-7/28/2022  Date of Return to MD: 5weeks  Visit # / Visits authorized: 10/ 20      Precautions:  Standard    Time In: 4:07  Time Out: 4:54  Total Billable Time:47 minutes    SUBJECTIVE     Pt reports: hand and wrists feel better today.  She was compliant with home exercise program given last session.   Response to previous treatment:  Functional change:     Pain: 0/10  Location: left fingers , hands  and wrists      OBJECTIVE     Objective Measures updated at progress report unless specified.    Treatment     MADHAVI received the treatments listed below:     Supervised modalities after being cleared for contradictions: Paraffin bath -N/A    Direct contact modalities after being cleared for contraindications:Iontophoresis-N/A    Manual therapy techniques: Joint mobilizations, Myofacial release, Soft tissue Mobilization and Friction Massage were applied to the: LUE for 20 minutes, including:  -LUE PROM- wrist flx/ext- 2x10 , joint mobz/distraction- 2x10 , Tendon glides- 3x5    Therapeutic exercises to develop strength, endurance, ROM and flexibility for 27 minutes, including:  -LUE AROM- wrist flx/ext tband-3x10  , Handgripper- 6x10 , therabar-3x10  , powerweb flx/ext-3x10  , digi flx-  3x10        Patient Education and Home Exercises      Education provided:   -   - Progress towards goals     Written Home Exercises Provided: Patient instructed to cont prior HEP.  Exercises were reviewed and MADHAVI was able to demonstrate them prior to the end  of the session.  DANNA demonstrated good  understanding of the HEP provided. See EMR under Patient Instructions for exercises provided during therapy sessions.       Assessment     Pt would continue to benefit from skilled OT.      DANNA is progressing well towards her goals and there are no updates to goals at this time. Pt prognosis is Excellent.     Pt will continue to benefit from skilled outpatient occupational therapy to address the deficits listed in the problem list on initial evaluation provide pt/family education and to maximize pt's level of independence in the home and community environment.     Pt's spiritual, cultural and educational needs considered and pt agreeable to plan of care and goals.    Anticipated barriers to occupational therapy:     Goals:  Pt. Will increase AROM of LUE as measured by goniometric measurements.  Pt. Will increase Left /pinch strength as measured by dynamometer/pinch gauge.  Pt. Will demonstrate ability to utilize LUE to perform functional tasks (I).  Pt. Will be (I) with HEP.    PLAN     Continue OT POC.      RONEY YU, OT

## 2022-07-19 ENCOUNTER — CLINICAL SUPPORT (OUTPATIENT)
Dept: REHABILITATION | Facility: HOSPITAL | Age: 62
End: 2022-07-19
Payer: COMMERCIAL

## 2022-07-19 DIAGNOSIS — G56.02 CARPAL TUNNEL SYNDROME ON LEFT: Primary | ICD-10-CM

## 2022-07-19 PROCEDURE — 97140 MANUAL THERAPY 1/> REGIONS: CPT

## 2022-07-19 PROCEDURE — 97110 THERAPEUTIC EXERCISES: CPT

## 2022-07-19 NOTE — PROGRESS NOTES
[facility]  Occupational Therapy Treatment Note    Date: 7/19/2022  Name: Madhavi ALAN [facility] Number: [id number]    Therapy Diagnosis: No diagnosis found.  Physician: Geovanny Kendrick MD    Physician Orders: Evaluate and treat.  Medical Diagnosis: Carpal tunnel syndrome on left [G56.02]  Surgical Procedure and Date: 4/1/2022,   Evaluation Date: 5/19/2022  Insurance Authorization Period Expiration: 5/16/2022 - 5/16/2023  Plan of Care Expiration: 5/19/2022-7/28/2022  Date of Return to MD: 5weeks  Visit # / Visits authorized: 11/ 20      Precautions:  Standard    Time In: 3:49  Time Out: 4:40  Total Billable Time:51 minutes    SUBJECTIVE     Pt reports: no new complaints.  She was compliant with home exercise program given last session.   Response to previous treatment:  Functional change:     Pain: 0/10  Location: left fingers , hands  and wrists      OBJECTIVE     Objective Measures updated at progress report unless specified.    Treatment     MADHAVI received the treatments listed below:     Supervised modalities after being cleared for contradictions: Paraffin bath -N/A    Direct contact modalities after being cleared for contraindications:Iontophoresis-N/A    Manual therapy techniques: Joint mobilizations, Myofacial release, Soft tissue Mobilization and Friction Massage were applied to the: LUE for 20 minutes, including:  -LUE PROM- wrist flx/ext- 2x10 , joint mobz/distraction- 2x10 , Tendon glides- 3x5    Therapeutic exercises to develop strength, endurance, ROM and flexibility for 31 minutes, including:  -LUE AROM- wrist flx/ext tband-3x10  , Handgripper- 6x10 , therabar-3x10  , powerweb flx/ext-3x10  , digi flx-  3x10        Patient Education and Home Exercises      Education provided:   -   - Progress towards goals     Written Home Exercises Provided: Patient instructed to cont prior HEP.  Exercises were reviewed and MADHAVI was able to demonstrate them prior to the end of the session.   DANNA demonstrated good  understanding of the HEP provided. See EMR under Patient Instructions for exercises provided during therapy sessions.       Assessment     Pt would continue to benefit from skilled OT.      DANNA is progressing well towards her goals and there are no updates to goals at this time. Pt prognosis is Excellent.     Pt will continue to benefit from skilled outpatient occupational therapy to address the deficits listed in the problem list on initial evaluation provide pt/family education and to maximize pt's level of independence in the home and community environment.     Pt's spiritual, cultural and educational needs considered and pt agreeable to plan of care and goals.    Anticipated barriers to occupational therapy:     Goals:  Pt. Will increase AROM of LUE as measured by goniometric measurements.  Pt. Will increase Left /pinch strength as measured by dynamometer/pinch gauge.  Pt. Will demonstrate ability to utilize LUE to perform functional tasks (I).  Pt. Will be (I) with HEP.    PLAN     Continue OT POC.      RONEY YU, OT

## 2022-07-21 ENCOUNTER — CLINICAL SUPPORT (OUTPATIENT)
Dept: REHABILITATION | Facility: HOSPITAL | Age: 62
End: 2022-07-21
Payer: COMMERCIAL

## 2022-07-21 DIAGNOSIS — G56.02 CARPAL TUNNEL SYNDROME ON LEFT: Primary | ICD-10-CM

## 2022-07-21 PROCEDURE — 97140 MANUAL THERAPY 1/> REGIONS: CPT

## 2022-07-21 PROCEDURE — 97110 THERAPEUTIC EXERCISES: CPT

## 2022-07-21 NOTE — PROGRESS NOTES
[facility]  Occupational Therapy Treatment Note    Date: 7/21/2022  Name: Madhavi ALAN [facility] Number: [id number]    Therapy Diagnosis: No diagnosis found.  Physician: Geovanny Kendrick MD    Physician Orders: Evaluate and treat.  Medical Diagnosis: Carpal tunnel syndrome on left [G56.02]  Surgical Procedure and Date: 4/1/2022,   Evaluation Date: 5/19/2022  Insurance Authorization Period Expiration: 5/16/2022 - 5/16/2023  Plan of Care Expiration: 5/19/2022-7/28/2022  Date of Return to MD: 5weeks  Visit # / Visits authorized: 12/ 20      Precautions:  Standard    Time In: 3:54  Time Out: 4:45  Total Billable Time: 51minutes    SUBJECTIVE     Pt reports: no new complaints.  She was compliant with home exercise program given last session.   Response to previous treatment:  Functional change:     Pain: 0/10  Location: left fingers , hands  and wrists      OBJECTIVE     Objective Measures updated at progress report unless specified.    Treatment     MADHAVI received the treatments listed below:     Supervised modalities after being cleared for contradictions: Paraffin bath -N/A    Direct contact modalities after being cleared for contraindications:Iontophoresis-N/A    Manual therapy techniques: Joint mobilizations, Myofacial release, Soft tissue Mobilization and Friction Massage were applied to the: LUE for 20 minutes, including:  -LUE PROM- wrist flx/ext- 2x10 , joint mobz/distraction- 2x10 , Tendon glides- 3x5    Therapeutic exercises to develop strength, endurance, ROM and flexibility for 31 minutes, including:  -LUE AROM- wrist flx/ext tband-3x10  , Handgripper- 6x10 , therabar-N/A , powerweb flx/ext-N/A  , digi flx-  3x10, supination DB- 3x10        Patient Education and Home Exercises      Education provided:   -   - Progress towards goals     Written Home Exercises Provided: Patient instructed to cont prior HEP.  Exercises were reviewed and MADHAVI was able to demonstrate them prior to the  end of the session.  MADHAVI demonstrated good  understanding of the HEP provided. See EMR under Patient Instructions for exercises provided during therapy sessions.       Assessment     Pt would continue to benefit from skilled OT.      MADHAVI is progressing well towards her goals and there are no updates to goals at this time. Pt prognosis is Excellent.     Pt will continue to benefit from skilled outpatient occupational therapy to address the deficits listed in the problem list on initial evaluation provide pt/family education and to maximize pt's level of independence in the home and community environment.     Pt's spiritual, cultural and educational needs considered and pt agreeable to plan of care and goals.    Anticipated barriers to occupational therapy:     Goals:  Pt. Will increase AROM of LUE as measured by goniometric measurements.( LUE AROM- wrist flx- 60 , wrist ext-78  , digit 1 MP - 63 , IP-67  , 2MCP- 86 , PIP-111  , DIP- 76 , 3MCP- 86 , PIP-111   , DIP-71  , 4MCP-91  , PIP- 111 , DIP-72  , 5MCP-94  , PIP- 100 , DIP-73  )  Pt. Will increase Left /pinch strength as measured by dynamometer/pinch gauge.( Left - 41 , Pinch- lateral-10  , tip-7  , tripod- 9 )  Pt. Will demonstrate ability to utilize LUE to perform functional tasks (I).( pt. Demonstrates ability to utilize LUE to perform functional tasks (I)  Pt. Will be (I) with HEP.( Pt. Verbalizes and demonstrates (I) with HEP.)    PLAN     Pt. Has met all goals for OT. D/C to HEP.      RONEY YU OT     [facility]   Discharge Note    [id number]    Therapy Diagnosis: No diagnosis found.  Physician: Geovanny Kendrick MD    Physician Orders: Evaluate and treat.  Medical Diagnosis: Carpal tunnel syndrome on left [G56.02]    Evaluation Date: 5/19/2022      Date of Last visit: 7/21/2022  Total Visits Received: 12    ASSESSMENT      Pt. Reports she feels she is ready to continue exercises  at home. Pt. Has met all goals for OT. D/C to HEP.    Discharge reason: Patient has met all of his/her goals    Discharge FOTO Score: 69%    Goals:   Pt. Will increase AROM of LUE as measured by goniometric measurements.( LUE AROM- wrist flx- 60 , wrist ext-78  , digit 1 MP - 63 , IP-67  , 2MCP- 86 , PIP-111  , DIP- 76 , 3MCP- 86 , PIP-111   , DIP-71  , 4MCP-91  , PIP- 111 , DIP-72  , 5MCP-94  , PIP- 100 , DIP-73  )  Pt. Will increase Left /pinch strength as measured by dynamometer/pinch gauge.( Left - 41 , Pinch- lateral-10  , tip-7  , tripod- 9 )  Pt. Will demonstrate ability to utilize LUE to perform functional tasks (I).( pt. Demonstrates ability to utilize LUE to perform functional tasks (I)  Pt. Will be (I) with HEP.( Pt. Verbalizes and demonstrates (I) with HEP.)      PLAN   This patient is discharged from Occupational Therapy      RNOEY YU, OT

## 2024-01-23 ENCOUNTER — HOSPITAL ENCOUNTER (OUTPATIENT)
Dept: RADIOLOGY | Facility: HOSPITAL | Age: 64
Discharge: HOME | End: 2024-01-23
Payer: COMMERCIAL

## 2024-01-23 DIAGNOSIS — Z12.31 VISIT FOR SCREENING MAMMOGRAM: ICD-10-CM

## 2024-01-23 PROCEDURE — 77067 SCR MAMMO BI INCL CAD: CPT | Mod: TC

## 2024-01-23 PROCEDURE — 77067 SCR MAMMO BI INCL CAD: CPT | Mod: 26,,, | Performed by: RADIOLOGY

## 2024-01-23 PROCEDURE — 77063 BREAST TOMOSYNTHESIS BI: CPT | Mod: 26,,, | Performed by: RADIOLOGY

## 2025-02-17 ENCOUNTER — HOSPITAL ENCOUNTER (OUTPATIENT)
Dept: RADIOLOGY | Facility: HOSPITAL | Age: 65
Discharge: HOME | End: 2025-02-17
Attending: RADIOLOGY
Payer: COMMERCIAL

## 2025-02-17 DIAGNOSIS — R92.8 ABNORMAL MAMMOGRAM: ICD-10-CM

## 2025-02-17 DIAGNOSIS — Z12.31 VISIT FOR SCREENING MAMMOGRAM: ICD-10-CM

## 2025-02-17 PROCEDURE — 77067 SCR MAMMO BI INCL CAD: CPT | Mod: TC

## 2025-02-17 PROCEDURE — 76641 ULTRASOUND BREAST COMPLETE: CPT | Mod: TC,50

## 2025-06-08 ENCOUNTER — OFFICE VISIT (OUTPATIENT)
Dept: FAMILY MEDICINE | Facility: CLINIC | Age: 65
End: 2025-06-08
Payer: COMMERCIAL

## 2025-06-08 VITALS
HEART RATE: 62 BPM | SYSTOLIC BLOOD PRESSURE: 158 MMHG | TEMPERATURE: 98 F | WEIGHT: 123 LBS | DIASTOLIC BLOOD PRESSURE: 84 MMHG

## 2025-06-08 DIAGNOSIS — M70.21 OLECRANON BURSITIS OF RIGHT ELBOW: Primary | ICD-10-CM

## 2025-06-08 DIAGNOSIS — M25.421 SWELLING OF RIGHT ELBOW: ICD-10-CM

## 2025-06-08 PROCEDURE — 1160F RVW MEDS BY RX/DR IN RCRD: CPT | Mod: CPTII,,, | Performed by: FAMILY MEDICINE

## 2025-06-08 PROCEDURE — 1159F MED LIST DOCD IN RCRD: CPT | Mod: CPTII,,, | Performed by: FAMILY MEDICINE

## 2025-06-08 PROCEDURE — 99204 OFFICE O/P NEW MOD 45 MIN: CPT | Mod: 25,,, | Performed by: FAMILY MEDICINE

## 2025-06-08 PROCEDURE — 99051 MED SERV EVE/WKEND/HOLIDAY: CPT | Mod: ,,, | Performed by: FAMILY MEDICINE

## 2025-06-08 PROCEDURE — 96372 THER/PROPH/DIAG INJ SC/IM: CPT | Mod: ,,, | Performed by: FAMILY MEDICINE

## 2025-06-08 PROCEDURE — 3077F SYST BP >= 140 MM HG: CPT | Mod: CPTII,,, | Performed by: FAMILY MEDICINE

## 2025-06-08 PROCEDURE — 3079F DIAST BP 80-89 MM HG: CPT | Mod: CPTII,,, | Performed by: FAMILY MEDICINE

## 2025-06-08 PROCEDURE — 3044F HG A1C LEVEL LT 7.0%: CPT | Mod: CPTII,,, | Performed by: FAMILY MEDICINE

## 2025-06-08 RX ORDER — DICLOFENAC SODIUM 75 MG/1
75 TABLET, DELAYED RELEASE ORAL 2 TIMES DAILY PRN
Qty: 20 TABLET | Refills: 0 | Status: SHIPPED | OUTPATIENT
Start: 2025-06-08

## 2025-06-08 RX ORDER — EMPAGLIFLOZIN 25 MG/1
25 TABLET, FILM COATED ORAL
COMMUNITY

## 2025-06-08 RX ORDER — ESTRADIOL 0.5 MG/1
0.5 TABLET ORAL
COMMUNITY
Start: 2025-05-22

## 2025-06-08 RX ORDER — SULFAMETHOXAZOLE AND TRIMETHOPRIM 800; 160 MG/1; MG/1
1 TABLET ORAL 2 TIMES DAILY
Qty: 10 TABLET | Refills: 0 | Status: SHIPPED | OUTPATIENT
Start: 2025-06-08 | End: 2025-06-13

## 2025-06-08 RX ORDER — SITAGLIPTIN AND METFORMIN HYDROCHLORIDE 1000; 100 MG/1; MG/1
1 TABLET, FILM COATED, EXTENDED RELEASE ORAL
COMMUNITY

## 2025-06-08 RX ORDER — KETOROLAC TROMETHAMINE 30 MG/ML
60 INJECTION, SOLUTION INTRAMUSCULAR; INTRAVENOUS
Status: COMPLETED | OUTPATIENT
Start: 2025-06-08 | End: 2025-06-08

## 2025-06-08 RX ORDER — ACETAMINOPHEN 500 MG
5000 TABLET ORAL
COMMUNITY
Start: 2025-05-21

## 2025-06-08 RX ADMIN — KETOROLAC TROMETHAMINE 60 MG: 30 INJECTION, SOLUTION INTRAMUSCULAR; INTRAVENOUS at 05:06

## 2025-06-08 NOTE — LETTER
June 8, 2025      [facility]  [address]  [place] [address]  Phone: [phone]  Fax: [phone]       Patient: Madhavi Simons   YOB: 1960  Date of Visit: 06/08/2025    To Whom It May Concern:    CHANDRIKA Simons  was at [facility] on 06/08/2025. The patient may return to work/school on 06/11/2025 with no restrictions. If you have any questions or concerns, or if I can be of further assistance, please do not hesitate to contact me.    Sincerely,    Augusto Suresh II, DO

## 2025-06-08 NOTE — PROGRESS NOTES
Subjective:       Patient ID: Madhavi Simons is a 64 y.o. female.    Chief Complaint: Joint Swelling (Right elbow swelling . Just noticed this morning. No known injury. No pain. Feels hot to touch. Pt states possible insect bite)    HPI  Review of Systems   Constitutional:  Negative for activity change, appetite change, chills, diaphoresis, fatigue, fever and unexpected weight change.   HENT:  Negative for nasal congestion, dental problem, drooling, ear discharge, ear pain, facial swelling, hearing loss, mouth sores, nosebleeds, postnasal drip, rhinorrhea, sinus pressure/congestion, sneezing, sore throat, tinnitus, trouble swallowing, voice change and goiter.    Eyes:  Negative for photophobia, discharge, itching and visual disturbance.   Respiratory:  Negative for apnea, cough, choking, chest tightness, shortness of breath, wheezing and stridor.    Cardiovascular:  Negative for chest pain, palpitations, leg swelling and claudication.   Gastrointestinal:  Negative for abdominal distention, abdominal pain, anal bleeding, blood in stool, change in bowel habit, constipation, diarrhea, nausea and vomiting.   Endocrine: Negative for cold intolerance, heat intolerance, polydipsia, polyphagia and polyuria.   Genitourinary:  Negative for bladder incontinence, decreased urine volume, difficulty urinating, dysuria, enuresis, flank pain, frequency, hematuria, nocturia, pelvic pain and urgency.   Musculoskeletal:  Positive for arthralgias and joint swelling. Negative for back pain, gait problem, leg pain, myalgias, neck pain, neck stiffness and joint deformity.   Integumentary:  Negative for pallor, rash, wound, breast mass and breast tenderness.   Allergic/Immunologic: Negative for environmental allergies, food allergies and immunocompromised state.   Neurological:  Negative for dizziness, vertigo, tremors, seizures, syncope, facial asymmetry, speech difficulty, weakness, light-headedness, numbness, headaches, coordination  difficulties and memory loss.   Hematological:  Negative for adenopathy. Does not bruise/bleed easily.   Psychiatric/Behavioral:  Negative for agitation, behavioral problems, confusion, decreased concentration, dysphoric mood, hallucinations, self-injury, sleep disturbance and suicidal ideas. The patient is not nervous/anxious and is not hyperactive.    Breast: Negative for mass and tenderness        Objective:      Physical Exam  Vitals reviewed.   Constitutional:       Appearance: Normal appearance.   HENT:      Head: Normocephalic and atraumatic.      Right Ear: Tympanic membrane, ear canal and external ear normal.      Left Ear: Tympanic membrane, ear canal and external ear normal.      Nose: Nose normal.      Mouth/Throat:      Mouth: Mucous membranes are moist.      Pharynx: Oropharynx is clear.   Eyes:      Extraocular Movements: Extraocular movements intact.      Conjunctiva/sclera: Conjunctivae normal.      Pupils: Pupils are equal, round, and reactive to light.   Cardiovascular:      Rate and Rhythm: Normal rate and regular rhythm.      Pulses: Normal pulses.      Heart sounds: Normal heart sounds.   Pulmonary:      Effort: Pulmonary effort is normal.      Breath sounds: Normal breath sounds.   Abdominal:      General: Bowel sounds are normal.      Palpations: Abdomen is soft.   Musculoskeletal:         General: Swelling and tenderness present. Normal range of motion.      Cervical back: Normal range of motion and neck supple.      Comments: Right elbow swelling, ttp. Swelling is mild.   Skin:     General: Skin is warm and dry.   Neurological:      General: No focal deficit present.      Mental Status: She is alert. Mental status is at baseline.   Psychiatric:         Mood and Affect: Mood normal.         Behavior: Behavior normal.         Thought Content: Thought content normal.         Judgment: Judgment normal.         Assessment:       1. Olecranon bursitis of right elbow    2. Swelling of right elbow         Plan:     Olecranon bursitis of right elbow  -     ketorolac injection 60 mg  -     diclofenac (VOLTAREN) 75 MG EC tablet; Take 1 tablet (75 mg total) by mouth 2 (two) times daily as needed.  Dispense: 20 tablet; Refill: 0    Swelling of right elbow  -     sulfamethoxazole-trimethoprim 800-160mg (BACTRIM DS) 800-160 mg Tab; Take 1 tablet by mouth 2 (two) times daily. for 5 days  Dispense: 10 tablet; Refill: 0         Likely bursitis based on location, too small amount of fluid to drain, will treat with antiinflammatories, will give abx incase infection present.